# Patient Record
Sex: MALE | Race: ASIAN | NOT HISPANIC OR LATINO | URBAN - METROPOLITAN AREA
[De-identification: names, ages, dates, MRNs, and addresses within clinical notes are randomized per-mention and may not be internally consistent; named-entity substitution may affect disease eponyms.]

---

## 2022-04-19 ENCOUNTER — OFFICE VISIT (OUTPATIENT)
Dept: OPTOMETRY | Facility: CLINIC | Age: 62
End: 2022-04-19
Payer: COMMERCIAL

## 2022-04-19 DIAGNOSIS — H11.052 PROGRESSIVE PERIPHERAL PTERYGIUM OF LEFT EYE: Primary | ICD-10-CM

## 2022-04-19 PROCEDURE — 99999 PR PBB SHADOW E&M-EST. PATIENT-LVL III: ICD-10-PCS | Mod: PBBFAC,,, | Performed by: OPTOMETRIST

## 2022-04-19 PROCEDURE — 92004 COMPRE OPH EXAM NEW PT 1/>: CPT | Mod: S$GLB,,, | Performed by: OPTOMETRIST

## 2022-04-19 PROCEDURE — 99999 PR PBB SHADOW E&M-EST. PATIENT-LVL III: CPT | Mod: PBBFAC,,, | Performed by: OPTOMETRIST

## 2022-04-19 PROCEDURE — 1159F MED LIST DOCD IN RCRD: CPT | Mod: CPTII,S$GLB,, | Performed by: OPTOMETRIST

## 2022-04-19 PROCEDURE — 92004 PR EYE EXAM, NEW PATIENT,COMPREHESV: ICD-10-PCS | Mod: S$GLB,,, | Performed by: OPTOMETRIST

## 2022-04-19 PROCEDURE — 1159F PR MEDICATION LIST DOCUMENTED IN MEDICAL RECORD: ICD-10-PCS | Mod: CPTII,S$GLB,, | Performed by: OPTOMETRIST

## 2022-04-19 RX ORDER — PREDNISOLONE ACETATE 10 MG/ML
SUSPENSION/ DROPS OPHTHALMIC
COMMUNITY
Start: 2022-01-31

## 2022-04-19 NOTE — PATIENT INSTRUCTIONS
Kirshna Quiroz   [address], [address]  [place] [address]  [phone]  [email or web address]

## 2022-04-19 NOTE — PROGRESS NOTES
DESIREE CASE January 31 2022  Patient is here for redness OS for 2 months patient was given prednisoLONE   acetate (PRED FORTE) 1 %   but did not clear up the redness uses drop PRN   . No itchy or burning , cloud over vision due to the growth            Last edited by Jaciel Yuen, PCT on 4/19/2022  9:49 AM. (History)            Assessment /Plan     For exam results, see Encounter Report.    Progressive peripheral pterygium of left eye  -Stop Pred Forte  -Start Systane QID  -reviewed surgical options with no available surgeons at [facility]  -Pt given Krishna Quiroz info if wanted Surgical consult      RTC 1 yr